# Patient Record
Sex: FEMALE | Race: BLACK OR AFRICAN AMERICAN | NOT HISPANIC OR LATINO
[De-identification: names, ages, dates, MRNs, and addresses within clinical notes are randomized per-mention and may not be internally consistent; named-entity substitution may affect disease eponyms.]

---

## 2019-09-19 ENCOUNTER — APPOINTMENT (OUTPATIENT)
Dept: PULMONOLOGY | Facility: CLINIC | Age: 54
End: 2019-09-19
Payer: COMMERCIAL

## 2019-09-19 VITALS
RESPIRATION RATE: 16 BRPM | SYSTOLIC BLOOD PRESSURE: 130 MMHG | OXYGEN SATURATION: 98 % | HEIGHT: 63 IN | BODY MASS INDEX: 36.14 KG/M2 | DIASTOLIC BLOOD PRESSURE: 80 MMHG | WEIGHT: 204 LBS | HEART RATE: 82 BPM

## 2019-09-19 DIAGNOSIS — Z82.49 FAMILY HISTORY OF ISCHEMIC HEART DISEASE AND OTHER DISEASES OF THE CIRCULATORY SYSTEM: ICD-10-CM

## 2019-09-19 DIAGNOSIS — Z82.5 FAMILY HISTORY OF ASTHMA AND OTHER CHRONIC LOWER RESPIRATORY DISEASES: ICD-10-CM

## 2019-09-19 DIAGNOSIS — Z87.898 PERSONAL HISTORY OF OTHER SPECIFIED CONDITIONS: ICD-10-CM

## 2019-09-19 DIAGNOSIS — Z83.3 FAMILY HISTORY OF DIABETES MELLITUS: ICD-10-CM

## 2019-09-19 DIAGNOSIS — Z87.2 PERSONAL HISTORY OF DISEASES OF THE SKIN AND SUBCUTANEOUS TISSUE: ICD-10-CM

## 2019-09-19 DIAGNOSIS — Z86.2 PERSONAL HISTORY OF DISEASES OF THE BLOOD AND BLOOD-FORMING ORGANS AND CERTAIN DISORDERS INVOLVING THE IMMUNE MECHANISM: ICD-10-CM

## 2019-09-19 DIAGNOSIS — Z81.1 FAMILY HISTORY OF ALCOHOL ABUSE AND DEPENDENCE: ICD-10-CM

## 2019-09-19 DIAGNOSIS — Z81.8 FAMILY HISTORY OF OTHER MENTAL AND BEHAVIORAL DISORDERS: ICD-10-CM

## 2019-09-19 DIAGNOSIS — Z86.39 PERSONAL HISTORY OF OTHER ENDOCRINE, NUTRITIONAL AND METABOLIC DISEASE: ICD-10-CM

## 2019-09-19 DIAGNOSIS — Z86.79 PERSONAL HISTORY OF OTHER DISEASES OF THE CIRCULATORY SYSTEM: ICD-10-CM

## 2019-09-19 PROCEDURE — 99204 OFFICE O/P NEW MOD 45 MIN: CPT | Mod: 25

## 2019-09-19 PROCEDURE — 71046 X-RAY EXAM CHEST 2 VIEWS: CPT

## 2019-09-19 PROCEDURE — 94729 DIFFUSING CAPACITY: CPT

## 2019-09-19 PROCEDURE — 94727 GAS DIL/WSHOT DETER LNG VOL: CPT

## 2019-09-19 PROCEDURE — 94060 EVALUATION OF WHEEZING: CPT

## 2019-09-19 PROCEDURE — ZZZZZ: CPT

## 2019-09-19 NOTE — ADDENDUM
[FreeTextEntry1] : Documented by Efrain Cabezas acting as a scribe for Dr. Rodolfo Esposito on 09/19/2019.\par \par All medical record entries made by the Scribe were at my, Dr. Rodolfo Esposito's, direction and personally dictated by me on 09/19/2019. I have reviewed the chart and agree that the record accurately reflects my personal performance of the history, physical exam, assessment and plan. I have also personally directed, reviewed, and agree with the discharge instructions.

## 2019-09-19 NOTE — ASSESSMENT
[FreeTextEntry1] : Ms. ROBERTSON is a 54 year old female with a history of sarcoidosis, "obesity" in the past s/p gastric sleeve, HTN, elevated cholesterol, anemia, eczema, allergies who comes into the office today for pulmonary evaluation\par \par The patient's shortness of breath is multifactorial due to:\par -pulmonary disease \par      -Sarcoidosis (inactive)\par      -?mild asthma\par -poor breathing mechanics \par -overweight/out of shape\par -CAD (unlikely)\par \par Problem 1: Sarcoidosis (inactive)\par -Complete yearly eye exams yearly\par -complete PFTS once or twice per year\par -ACE level is pending\par \par -Sarcoidosis is a medical mystery and can present with very serious illness or little consequence. The disease can affect any organ including skin, liver, lymph glands, spleen, eyes, nervous system, musculoskeletal system, heart, brain, kidneys, and including the lungs. Pulmonary sarcoidosis can cause loss of lung volume and abnormal lung stiffness. This disease is characterized by presence of granulomas, small areas of inflamed cells. Sarcoidosis patients should have regular cardiac and eye examinations as well as regular PFTs. \par \par Problem 2: ?Mild Asthma\par -Add Breo Ellipta 200 at 1 inhalation daily \par -Add Ventolin rescue inhaler 2 inhalations before exercise, Q6H \par \par -Inhaler technique reviewed as well as oral hygiene techniques reviewed with patient. Avoidance of cold air, extremes of temperature, rescue inhaler should be used before exercise. Order of medication reviewed with patient. Recommended use of a cool mist humidifier in the bedroom.\par -Asthma is believed to be caused by inherited (genetic) and environmental factor, but its exact cause is unknown. Asthma may be triggered by allergens, lung infections, or irritants in the air. Asthma triggers are different for each person \par \par Problem 3: GERD\par -Add Pepcid 40 mg QHS, if needed\par -Rule of 2s: avoid eating too much, eating too fast, eating too late, eating too spicy, eating too lousy, eating two hours before bed.\par -Things to avoid including overeating, spicy foods, tight clothing, eating within three hours of bed, this list is not all inclusive. \par -For treatment of reflux, possible options discussed including diet control, H2 blockers, PPIs, as well as coating motility agents discussed as treatment options. Timing of meals and proximity of last meal to sleep were discussed. If symptoms persist, a formal gastrointestinal evaluation is needed.\par \par Problem 4: Allergy / sinus\par -Complete blood work to include: ACE level, IgE level, eosinophil level, vitamin D level, food IgE level, and asthma profile \par \par Environmental measures for allergies were encouraged including mattress and pillow cover, air purifier, and environmental controls. \par \par Problem 5: r/o Gustatory Rhinitis\par -Add Atrovent Nasal\par -Gustatory rhinitis is an overreaction of the vagal nerve in the nervous system upon ingesting certain foods including, but not limited to, hot food, spicy food, and alcohol intake. Gustatory rhinitis symptoms include runny, usually clear, watery discharge, which is a result dilation of blood vessels. Other symptoms can include nasal congestion and sneezing. Treatment options include corticosteroid nasal sprays, mucolytic drugs, decongestant nasal sprays, and anticholinergic agents as well as avoidance of triggers.\par \par Problem 6: r/o FLOYD\par -Recommended to complete a home sleep study due to her snoring, EDS, and nocturia\par -Recommended not to drink 3 hours before bed\par \par Sleep apnea is associated with adverse clinical consequences which an affect most organ systems. Cardiovascular disease risk includes arrhythmias, atrial fibrillation, hypertension, coronary artery disease, and stroke. Metabolic disorders include diabetes type 2, non-alcoholic fatty liver disease. Mood disorder especially depression; and cognitive decline especially in the elderly. Associations with chronic reflux/Vallejo’s esophagus some but not all inclusive. \par -Reasons include arousal consistent with hypopnea; respiratory events most prominent in REM sleep or supine position; therefore sleep staging and body position are important for accurate diagnosis and estimation of AHI. \par \par Problem 7: Poor Mechanics of Breathing\par - Proper breathing techniques were reviewed with an emphasis of exhalation. Patient instructed to breath in for 1 second and out for four seconds. Patient was encouraged to not talk while walking. \par \par Problem 8: Overweight\par -Weight loss, exercise, and diet control were discussed and are highly encouraged. Treatment options were given such as, aqua therapy, and contacting a nutritionist. Recommended to use the elliptical, stationary bike, less use of treadmill. Mindful eating was explained to the patient Obesity is associated with worsening asthma, shortness of breath, and potential for cardiac disease, diabetes, and other underlying medical conditions. \par \par Problem 9: Cardiac\par -recommended to follow up with a cardiologist\par \par Problem 10: health maintenance\par -Recommended to use Livaguard for elevated cholesterol\par -Recommended to take CoQ10 and Vitamin D\par -s/p influenza vaccine\par -recommended strep pneumonia vaccines: Prevnar-13 vaccine, followed by Pneumo vaccine 23 one year following\par -recommended early intervention for URIs\par -recommended regular osteoporosis evaluations\par -recommended early dermatological evaluations\par -recommended after the age of 50 to the age of 70, colonoscopy every 5 years \par \par  Follow up in 6-8 weeks\par -he  is recommended to call with any changes, questions, or concerns.

## 2019-09-19 NOTE — REVIEW OF SYSTEMS
[Nasal Congestion] : nasal congestion [Postnasal Drip] : postnasal drip [Dyspnea] : dyspnea [Heartburn] : heartburn [Reflux] : reflux [Nocturia] : nocturia [Rash] : [unfilled] rash [Itch] : itching [As Noted in HPI] : as noted in HPI [Snoring] : snoring [Nonrestorative Sleep] : nonrestorative sleep [Negative] : Pulmonary Hypertension [Chest Discomfort] : no chest discomfort [Dysphagia] : no dysphagia [Constipation] : no constipation [Diarrhea] : no diarrhea

## 2019-09-19 NOTE — PHYSICAL EXAM
[General Appearance - Well Developed] : well developed [Normal Appearance] : normal appearance [Well Groomed] : well groomed [General Appearance - Well Nourished] : well nourished [General Appearance - In No Acute Distress] : no acute distress [No Deformities] : no deformities [Normal Conjunctiva] : the conjunctiva exhibited no abnormalities [Eyelids - No Xanthelasma] : the eyelids demonstrated no xanthelasmas [Normal Oropharynx] : normal oropharynx [III] : III [Neck Appearance] : the appearance of the neck was normal [Neck Cervical Mass (___cm)] : no neck mass was observed [Jugular Venous Distention Increased] : there was no jugular-venous distention [Thyroid Nodule] : there were no palpable thyroid nodules [Thyroid Diffuse Enlargement] : the thyroid was not enlarged [Heart Rate And Rhythm] : heart rate and rhythm were normal [Heart Sounds] : normal S1 and S2 [Murmurs] : no murmurs present [Respiration, Rhythm And Depth] : normal respiratory rhythm and effort [Exaggerated Use Of Accessory Muscles For Inspiration] : no accessory muscle use [Auscultation Breath Sounds / Voice Sounds] : lungs were clear to auscultation bilaterally [Abdomen Tenderness] : non-tender [Abdomen Soft] : soft [Abdomen Mass (___ Cm)] : no abdominal mass palpated [Abnormal Walk] : normal gait [Gait - Sufficient For Exercise Testing] : the gait was sufficient for exercise testing [Nail Clubbing] : no clubbing of the fingernails [Cyanosis, Localized] : no localized cyanosis [Petechial Hemorrhages (___cm)] : no petechial hemorrhages [Skin Color & Pigmentation] : normal skin color and pigmentation [Skin Turgor] : normal skin turgor [] : no rash [Deep Tendon Reflexes (DTR)] : deep tendon reflexes were 2+ and symmetric [Sensation] : the sensory exam was normal to light touch and pinprick [No Focal Deficits] : no focal deficits [Impaired Insight] : insight and judgment were intact [Oriented To Time, Place, And Person] : oriented to person, place, and time [Affect] : the affect was normal [FreeTextEntry1] : I:E ratio 1:3; clear

## 2019-09-19 NOTE — HISTORY OF PRESENT ILLNESS
[FreeTextEntry1] : Ms. ROBERTSON is a 54 year old female presenting to the office today for initial pulmonary evaluation. Her chief complaint is SOB.\par -she reports she was referred by [facility]\par -she notes she sneezes and has rhinorrhea after every meal for her whole life\par -she notes having sarcoidosis diagnosed in 1997, and was placed on 60 mg of Prednisone initially and gained a lot of weight and couldn’t get rid of it\par -she notes she becomes SOB when walking up stairs that has gotten worse recently (the past 6 months)\par -she notes she just had her kitchen renovated in the past 6 months\par -she notes she has heartburn in the evening occasionally\par -she notes she has eczema, and recently got a new medication for it, with rashes on her hands and her back\par -she notes she is sleeping well, using Tylenol PM\par -she is unsure if she currently snores, and reports having had a sleep study done before her surgery in 2013\par -she reports she occasionally wakes up tired\par -she is unsure if she could fall asleep in a car \par -she could fall asleep while watching a boring TV show \par -she notes she has a lump in her throat she has to clear occasionally\par -she notes she exercises by walking\par -she reports waking up with nocturia twice per night, and drinks a lot of water throughout the day\par -she doesn’t believe her Ventolin is helping her\par -she denies any lymphadenopathy, chest pain, chest pressure, diarrhea, constipation, dysphagia, dizziness, leg swelling, leg pain, itchy eyes, itchy ears, reflux, sour taste in the mouth, myalgias or arthralgias.

## 2019-11-20 ENCOUNTER — APPOINTMENT (OUTPATIENT)
Dept: OTOLARYNGOLOGY | Facility: CLINIC | Age: 54
End: 2019-11-20

## 2019-11-21 ENCOUNTER — APPOINTMENT (OUTPATIENT)
Dept: PULMONOLOGY | Facility: CLINIC | Age: 54
End: 2019-11-21

## 2020-01-05 ENCOUNTER — RX RENEWAL (OUTPATIENT)
Age: 55
End: 2020-01-05

## 2020-01-06 ENCOUNTER — RX RENEWAL (OUTPATIENT)
Age: 55
End: 2020-01-06

## 2020-03-25 ENCOUNTER — APPOINTMENT (OUTPATIENT)
Dept: PULMONOLOGY | Facility: CLINIC | Age: 55
End: 2020-03-25

## 2020-04-04 ENCOUNTER — RX RENEWAL (OUTPATIENT)
Age: 55
End: 2020-04-04

## 2020-04-15 ENCOUNTER — APPOINTMENT (OUTPATIENT)
Dept: PULMONOLOGY | Facility: CLINIC | Age: 55
End: 2020-04-15
Payer: COMMERCIAL

## 2020-04-15 PROCEDURE — 99214 OFFICE O/P EST MOD 30 MIN: CPT | Mod: 95

## 2020-04-15 NOTE — HISTORY OF PRESENT ILLNESS
[Home] : at home, [unfilled] , at the time of the visit. [Medical Office: ([facility])___] : at the medical office located in  [Patient] : the patient [Self] : self [FreeTextEntry2] : ANAYELI ROBERTSON  [FreeTextEntry1] : Ms. ROBERTSON is a 54 year old female video calls to the office today for f/u pulmonary evaluation. Her chief complaint is health maintenance. \par -she notes that Her bowels are regular. \par -Sleep is good. \par -has been trying walk. \par -has heartburn/ reflux. \par -no coughing or wheezing. \par -still has Gustatory rhinitis. \par -she states that she has loss some weight recently. \par -no arthritis. \par -has some back pain. \par \par -She denies any chest pain, chest pressure, diarrhea, constipation, dysphagia, dizziness, sour taste in the mouth, leg swelling, leg pain, itchy eyes, itchy ears, myalgias or arthralgias.

## 2020-04-15 NOTE — ASSESSMENT
[FreeTextEntry1] : Ms. ROBERTSON is a 54 year old female with a history of sarcoidosis, "obesity" in the past s/p gastric sleeve, HTN, elevated cholesterol, anemia, eczema, allergies who video calls into the office today for f/u visit for Asthma, Allergies, Gustatory Rhinitis. \par \par The patient's shortness of breath is multifactorial due to:\par -pulmonary disease \par      -Sarcoidosis (inactive)\par      -?mild asthma\par -poor breathing mechanics \par -overweight/out of shape\par -CAD (unlikely)\par \par Problem 1: Sarcoidosis (inactive)\par -Complete yearly eye exams yearly\par -complete PFTS once or twice per year\par -ACE level is pending\par \par -Sarcoidosis is a medical mystery and can present with very serious illness or little consequence. The disease can affect any organ including skin, liver, lymph glands, spleen, eyes, nervous system, musculoskeletal system, heart, brain, kidneys, and including the lungs. Pulmonary sarcoidosis can cause loss of lung volume and abnormal lung stiffness. This disease is characterized by presence of granulomas, small areas of inflamed cells. Sarcoidosis patients should have regular cardiac and eye examinations as well as regular PFTs. \par \par Problem 2: ?Mild Asthma\par -continue Breo Ellipta 200 at 1 inhalation daily \par -continue Ventolin rescue inhaler 2 inhalations before exercise, Q6H \par \par -Inhaler technique reviewed as well as oral hygiene techniques reviewed with patient. Avoidance of cold air, extremes of temperature, rescue inhaler should be used before exercise. Order of medication reviewed with patient. Recommended use of a cool mist humidifier in the bedroom.\par -Asthma is believed to be caused by inherited (genetic) and environmental factor, but its exact cause is unknown. Asthma may be triggered by allergens, lung infections, or irritants in the air. Asthma triggers are different for each person \par \par Problem 3: GERD\par -continue Pepcid 40 mg QHS, if needed\par -Rule of 2s: avoid eating too much, eating too fast, eating too late, eating too spicy, eating too lousy, eating two hours before bed.\par -Things to avoid including overeating, spicy foods, tight clothing, eating within three hours of bed, this list is not all inclusive. \par -For treatment of reflux, possible options discussed including diet control, H2 blockers, PPIs, as well as coating motility agents discussed as treatment options. Timing of meals and proximity of last meal to sleep were discussed. If symptoms persist, a formal gastrointestinal evaluation is needed.\par \par Problem 4: Allergy / sinus\par -Complete blood work to include: ACE level, IgE level, eosinophil level, vitamin D level, food IgE level, and asthma profile \par \par Environmental measures for allergies were encouraged including mattress and pillow cover, air purifier, and environmental controls. \par \par Problem 5: r/o Gustatory Rhinitis\par -Add Atrovent Nasal .67 2 sniff pre-meal.\par -Gustatory rhinitis is an overreaction of the vagal nerve in the nervous system upon ingesting certain foods including, but not limited to, hot food, spicy food, and alcohol intake. Gustatory rhinitis symptoms include runny, usually clear, watery discharge, which is a result dilation of blood vessels. Other symptoms can include nasal congestion and sneezing. Treatment options include corticosteroid nasal sprays, mucolytic drugs, decongestant nasal sprays, and anticholinergic agents as well as avoidance of triggers.\par \par Problem 6: r/o FLOYD\par -Recommended to complete a home sleep study due to her snoring, EDS, and nocturia\par -Recommended not to drink 3 hours before bed\par \par Sleep apnea is associated with adverse clinical consequences which an affect most organ systems. Cardiovascular disease risk includes arrhythmias, atrial fibrillation, hypertension, coronary artery disease, and stroke. Metabolic disorders include diabetes type 2, non-alcoholic fatty liver disease. Mood disorder especially depression; and cognitive decline especially in the elderly. Associations with chronic reflux/Vallejo’s esophagus some but not all inclusive. \par -Reasons include arousal consistent with hypopnea; respiratory events most prominent in REM sleep or supine position; therefore sleep staging and body position are important for accurate diagnosis and estimation of AHI. \par \par Problem 7: Poor Mechanics of Breathing\par - Proper breathing techniques were reviewed with an emphasis of exhalation. Patient instructed to breath in for 1 second and out for four seconds. Patient was encouraged to not talk while walking. \par \par Problem 8: Overweight\par -Weight loss, exercise, and diet control were discussed and are highly encouraged. Treatment options were given such as, aqua therapy, and contacting a nutritionist. Recommended to use the elliptical, stationary bike, less use of treadmill. Mindful eating was explained to the patient Obesity is associated with worsening asthma, shortness of breath, and potential for cardiac disease, diabetes, and other underlying medical conditions. \par \par Problem 9: Cardiac\par -recommended to follow up with a cardiologist\par \par Problem 10: Health Maintenance/COVID19 Precautions:\par - Clean your hands often. Wash your hands often with soap and water for at least 20 seconds, especially after blowing your nose, coughing, or sneezing, or having been in a public place.\par - If soap and water are not available, use a hand  that contains at least 60% alcohol.\par - To the extent possible, avoid touching high-touch surfaces in public places - elevator buttons, door handles, handrails, handshaking with people, etc. Use a tissue or your sleeve to cover your hand or finger if you must touch something.\par - Wash your hands after touching surfaces in public places.\par - Avoid touching your face, nose, eyes, etc.\par - Clean and disinfect your home to remove germs: practice routine cleaning of frequently touched surfaces (for example: tables, doorknobs, light switches, handles, desks, toilets, faucets, sinks & cell phones)\par - Avoid crowds, especially in poorly ventilated spaces. Your risk of exposure to respiratory viruses like COVID-19 may increase in crowded, closed-in settings with little air circulation if\par there are people in the crowd who are sick. All patients are recommended to practice social distancing and stay at least 6 feet away from others.\par - Avoid all non-essential travel including plane trips, and especially avoid embarking on cruise ships.\par -If COVID-19 is spreading in your community, take extra measures to put distance between yourself and other people to further reduce your risk of being exposed to this new virus.\par -Stay home as much as possible.\par - Consider ways of getting food brought to your house through family, social, or commercial networks\par -Be aware that the virus has been known to live in the air up to 3 hours post exposure, cardboard up to 24 hours post exposure, copper up to 4 hours post exposure, steel and plastic up to 2-3 days post exposure. Risk of transmission from these surfaces are affected by many variables.\par \par Immune Support Recommendations:\par -OTC Vitamin C 500mg BID \par -OTC Quercetin 250-500mg BID \par -OTC Zinc 75-100mg per day \par -OTC Melatonin 1or 2mg a night \par -OTC Vitamin D 1-4000mg per day\par -Tonic Water 8oz\par -Recommended to Stay Hydrated (At least a gallon/day)\par \par Asthma and COVID19:\par You need to make sure your asthma is under control. This often requires the use of inhaled corticosteroids (and sometimes oral corticosteroids). Inhaled corticosteroids do not likely reduce your immune system’s ability to fight infections, but oral corticosteroids may. It is important to use the steps above to protect yourself to limit your exposure to any respiratory virus. \par \par Problem 11: health maintenance\par -Recommended to use Livaguard for elevated cholesterol\par -Recommended to take CoQ10 and Vitamin D\par -s/p influenza vaccine\par -recommended strep pneumonia vaccines: Prevnar-13 vaccine, followed by Pneumo vaccine 23 one year following\par -recommended early intervention for URIs\par -recommended regular osteoporosis evaluations\par -recommended early dermatological evaluations\par -recommended after the age of 50 to the age of 70, colonoscopy every 5 years \par \par  Follow up in 3months SPI \par -he  is recommended to call with any changes, questions, or concerns.

## 2020-04-15 NOTE — REASON FOR VISIT
[Follow-Up] : a follow-up visit [FreeTextEntry1] : Video Telehealth - Sarcoidosis, SOB, OSAS, Asthma

## 2020-04-15 NOTE — ADDENDUM
[FreeTextEntry1] : Documented by Tashi Osborne acting as a scribe for Dr. Rodolfo Esposito on 04/15/2020 \par \par All medical record entries made by the Scribe were at my, Dr. Rodolfo Esposito's, direction and personally dictated by me on 04/15/2020 . I have reviewed the chart and agree that the record accurately reflects my personal performance of the history, physical exam, assessment and plan. I have also personally directed, reviewed, and agree with the discharge instructions.

## 2021-03-24 ENCOUNTER — RX RENEWAL (OUTPATIENT)
Age: 56
End: 2021-03-24

## 2021-03-26 ENCOUNTER — RX RENEWAL (OUTPATIENT)
Age: 56
End: 2021-03-26

## 2021-03-29 ENCOUNTER — APPOINTMENT (OUTPATIENT)
Dept: PULMONOLOGY | Facility: CLINIC | Age: 56
End: 2021-03-29
Payer: COMMERCIAL

## 2021-03-29 VITALS
TEMPERATURE: 97.3 F | SYSTOLIC BLOOD PRESSURE: 121 MMHG | BODY MASS INDEX: 36.14 KG/M2 | HEIGHT: 63 IN | HEART RATE: 94 BPM | OXYGEN SATURATION: 96 % | WEIGHT: 204 LBS | RESPIRATION RATE: 16 BRPM | DIASTOLIC BLOOD PRESSURE: 80 MMHG

## 2021-03-29 PROCEDURE — 99072 ADDL SUPL MATRL&STAF TM PHE: CPT

## 2021-03-29 PROCEDURE — 99214 OFFICE O/P EST MOD 30 MIN: CPT

## 2021-03-29 NOTE — PHYSICAL EXAM
[No Acute Distress] : no acute distress [Normal Oropharynx] : normal oropharynx [III] : Mallampati Class: III [Normal Appearance] : normal appearance [No Neck Mass] : no neck mass [Normal Rate/Rhythm] : normal rate/rhythm [Normal S1, S2] : normal s1, s2 [No Murmurs] : no murmurs [No Resp Distress] : no resp distress [Clear to Auscultation Bilaterally] : clear to auscultation bilaterally [No Abnormalities] : no abnormalities [Benign] : benign [Normal Gait] : normal gait [No Clubbing] : no clubbing [No Cyanosis] : no cyanosis [No Edema] : no edema [FROM] : FROM [Normal Color/ Pigmentation] : normal color/ pigmentation [No Focal Deficits] : no focal deficits [Oriented x3] : oriented x3 [Normal Affect] : normal affect [TextBox_2] : OW [TextBox_68] : I:E ratio 1:3; clear

## 2021-03-29 NOTE — HISTORY OF PRESENT ILLNESS
[FreeTextEntry1] : Ms. ROBERTSON is a 54 year old female presents to the office today for f/u pulmonary evaluation. Her chief complaint is\par \par -she notes generally feeling well\par -she notes improved following past sarcoidosis flair\par -she notes gustatory rhinitis quiet with improved eating mechanisms\par -she notes intermittent diarrhea\par -she notes weight loss with new diet and aiming to lose weight\par -she notes s/p COVID vaccine x 2\par -she notes her memory and concentration are good \par -she denies palpitations\par -she notes intermittent SOB exacerbated by walking and talking at the same time\par -she notes wheeze quiet with mask use during cold weather\par -she notes sleep improved on Lunesta \par -she notes unsure if snores\par \par -denies any chest pain, chest pressure, diarrhea, constipation, dysphagia, sour taste in the mouth, dizziness, leg swelling, leg pain, myalgias, arthralgias, itchy eyes, itchy ears, heartburn, or reflux.\par \par

## 2021-03-29 NOTE — ADDENDUM
[FreeTextEntry1] : Documented by Eliot Small acting as a scribe for Dr. Rodolfo Esposito on 03/29/2021.\par \par All medical record entries made by the Scribe were at my, Dr. Rodolfo Esposito's, direction and personally dictated by me on 03/29/2021 . I have reviewed the chart and agree that the record accurately reflects my personal performance of the history, physical exam, assessment and plan. I have also personally directed, reviewed, and agree with the discharge instructions. \par

## 2021-07-26 DIAGNOSIS — Z01.812 ENCOUNTER FOR PREPROCEDURAL LABORATORY EXAMINATION: ICD-10-CM

## 2021-08-05 ENCOUNTER — LABORATORY RESULT (OUTPATIENT)
Age: 56
End: 2021-08-05

## 2021-08-06 ENCOUNTER — APPOINTMENT (OUTPATIENT)
Dept: DISASTER EMERGENCY | Facility: CLINIC | Age: 56
End: 2021-08-06

## 2021-08-06 ENCOUNTER — NON-APPOINTMENT (OUTPATIENT)
Age: 56
End: 2021-08-06

## 2021-08-06 ENCOUNTER — LABORATORY RESULT (OUTPATIENT)
Age: 56
End: 2021-08-06

## 2021-08-06 LAB
24R-OH-CALCIDIOL SERPL-MCNC: 64.4 PG/ML
25(OH)D3 SERPL-MCNC: 10.3 NG/ML
BASOPHILS # BLD AUTO: 0.06 K/UL
BASOPHILS NFR BLD AUTO: 1 %
EOSINOPHIL # BLD AUTO: 0.08 K/UL
EOSINOPHIL NFR BLD AUTO: 1.4 %
GLUCOSE 1H P LAC PO SERPL-MCNC: 118 MG/DL
GLUCOSE 30M P LAC PO SERPL-MCNC: 119 MG/DL
HCT VFR BLD CALC: 33.3 %
HGB BLD-MCNC: 10.7 G/DL
IMM GRANULOCYTES NFR BLD AUTO: 0.3 %
LACTOSE 1.5H P LAC PO SERPL-SCNC: 119 MG/DL
LACTOSE 2H P 50 G LAC PO SERPL-MCNC: 109 MG/DL
LACTOSE 3H P LAC PO SERPL-MCNC: 108 MG/DL
LACTOSE PRE FAST SERPL-MCNC: 105 MG/DL
LYMPHOCYTES # BLD AUTO: 2.44 K/UL
LYMPHOCYTES NFR BLD AUTO: 41.3 %
MAN DIFF?: NORMAL
MCHC RBC-ENTMCNC: 27.3 PG
MCHC RBC-ENTMCNC: 32.1 GM/DL
MCV RBC AUTO: 84.9 FL
MONOCYTES # BLD AUTO: 0.33 K/UL
MONOCYTES NFR BLD AUTO: 5.6 %
NEUTROPHILS # BLD AUTO: 2.98 K/UL
NEUTROPHILS NFR BLD AUTO: 50.4 %
PLATELET # BLD AUTO: 240 K/UL
RBC # BLD: 3.92 M/UL
RBC # FLD: 14.7 %
WBC # FLD AUTO: 5.91 K/UL

## 2021-08-09 ENCOUNTER — APPOINTMENT (OUTPATIENT)
Dept: PULMONOLOGY | Facility: CLINIC | Age: 56
End: 2021-08-09
Payer: COMMERCIAL

## 2021-08-09 ENCOUNTER — NON-APPOINTMENT (OUTPATIENT)
Age: 56
End: 2021-08-09

## 2021-08-09 VITALS
HEART RATE: 84 BPM | TEMPERATURE: 97.5 F | OXYGEN SATURATION: 98 % | WEIGHT: 207 LBS | DIASTOLIC BLOOD PRESSURE: 70 MMHG | SYSTOLIC BLOOD PRESSURE: 120 MMHG | RESPIRATION RATE: 16 BRPM | HEIGHT: 65 IN | BODY MASS INDEX: 34.49 KG/M2

## 2021-08-09 DIAGNOSIS — K21.9 GASTRO-ESOPHAGEAL REFLUX DISEASE W/OUT ESOPHAGITIS: ICD-10-CM

## 2021-08-09 DIAGNOSIS — J30.9 ALLERGIC RHINITIS, UNSPECIFIED: ICD-10-CM

## 2021-08-09 DIAGNOSIS — J31.0 CHRONIC RHINITIS: ICD-10-CM

## 2021-08-09 DIAGNOSIS — J45.909 UNSPECIFIED ASTHMA, UNCOMPLICATED: ICD-10-CM

## 2021-08-09 DIAGNOSIS — R06.02 SHORTNESS OF BREATH: ICD-10-CM

## 2021-08-09 DIAGNOSIS — D86.9 SARCOIDOSIS, UNSPECIFIED: ICD-10-CM

## 2021-08-09 PROCEDURE — ZZZZZ: CPT

## 2021-08-09 PROCEDURE — 94010 BREATHING CAPACITY TEST: CPT

## 2021-08-09 PROCEDURE — 99214 OFFICE O/P EST MOD 30 MIN: CPT | Mod: 25

## 2021-08-09 NOTE — ASSESSMENT
[FreeTextEntry1] : Ms. ROBERTSON is a 56 year old female with a history of sarcoidosis, "obesity" in the past s/p gastric sleeve, HTN, elevated cholesterol, anemia, eczema, allergies who presents  into the office today for f/u visit for Asthma, Allergies, Gustatory Rhinitis. #1 issue is GI sx, diarrhea \par \par The patient's shortness of breath is multifactorial due to:\par -pulmonary disease \par      -Sarcoidosis (inactive)\par      -?mild asthma\par -poor breathing mechanics \par -overweight/out of shape\par -CAD (unlikely)\par \par Problem 1: Sarcoidosis (inactive)\par -Complete yearly eye exams yearly\par -complete PFTS once or twice per year\par -ACE level is pending\par \par -Sarcoidosis is a medical mystery and can present with very serious illness or little consequence. The disease can affect any organ including skin, liver, lymph glands, spleen, eyes, nervous system, musculoskeletal system, heart, brain, kidneys, and including the lungs. Pulmonary sarcoidosis can cause loss of lung volume and abnormal lung stiffness. This disease is characterized by presence of granulomas, small areas of inflamed cells. Sarcoidosis patients should have regular cardiac and eye examinations as well as regular PFTs. \par \par Problem 2: ?Mild Asthma\par -continue Breo Ellipta 200 at 1 inhalation daily \par -continue Ventolin rescue inhaler 2 inhalations before exercise, Q6H \par \par -Inhaler technique reviewed as well as oral hygiene techniques reviewed with patient. Avoidance of cold air, extremes of temperature, rescue inhaler should be used before exercise. Order of medication reviewed with patient. Recommended use of a cool mist humidifier in the bedroom.\par -Asthma is believed to be caused by inherited (genetic) and environmental factor, but its exact cause is unknown. Asthma may be triggered by allergens, lung infections, or irritants in the air. Asthma triggers are different for each person \par \par Problem 3: GERD\par -continue Pepcid 40 mg QHS, if needed\par -Rule of 2s: avoid eating too much, eating too fast, eating too late, eating too spicy, eating too lousy, eating two hours before bed.\par -Things to avoid including overeating, spicy foods, tight clothing, eating within three hours of bed, this list is not all inclusive. \par -For treatment of reflux, possible options discussed including diet control, H2 blockers, PPIs, as well as coating motility agents discussed as treatment options. Timing of meals and proximity of last meal to sleep were discussed. If symptoms persist, a formal gastrointestinal evaluation is needed.\par \par Problem 4: Allergy / sinus\par -Complete blood work to include: ACE level, IgE level, eosinophil level, vitamin D level, food IgE level, and asthma profile , comprehensive celiac cascade, lactose intolerance screening\par Environmental measures for allergies were encouraged including mattress and pillow cover, air purifier, and environmental controls. \par \par Problem 5: r/o Gustatory Rhinitis\par -continue Atrovent Nasal .67 2 sniff pre-meal.\par -Gustatory rhinitis is an overreaction of the vagal nerve in the nervous system upon ingesting certain foods including, but not limited to, hot food, spicy food, and alcohol intake. Gustatory rhinitis symptoms include runny, usually clear, watery discharge, which is a result dilation of blood vessels. Other symptoms can include nasal congestion and sneezing. Treatment options include corticosteroid nasal sprays, mucolytic drugs, decongestant nasal sprays, and anticholinergic agents as well as avoidance of triggers.\par \par Problem 6: r/o FLOYD\par -Recommended to complete a home sleep study due to her snoring, EDS, and nocturia\par -Recommended not to drink 3 hours before bed\par \par Sleep apnea is associated with adverse clinical consequences which an affect most organ systems. Cardiovascular disease risk includes arrhythmias, atrial fibrillation, hypertension, coronary artery disease, and stroke. Metabolic disorders include diabetes type 2, non-alcoholic fatty liver disease. Mood disorder especially depression; and cognitive decline especially in the elderly. Associations with chronic reflux/Vallejo’s esophagus some but not all inclusive. \par -Reasons include arousal consistent with hypopnea; respiratory events most prominent in REM sleep or supine position; therefore sleep staging and body position are important for accurate diagnosis and estimation of AHI. \par \par Problem 7: Poor Mechanics of Breathing\par - Proper breathing techniques were reviewed with an emphasis of exhalation. Patient instructed to breath in for 1 second and out for four seconds. Patient was encouraged to not talk while walking. \par \par Problem 8: Overweight\par -Weight loss, exercise, and diet control were discussed and are highly encouraged. Treatment options were given such as, aqua therapy, and contacting a nutritionist. Recommended to use the elliptical, stationary bike, less use of treadmill. Mindful eating was explained to the patient Obesity is associated with worsening asthma, shortness of breath, and potential for cardiac disease, diabetes, and other underlying medical conditions. \par \par Problem 9: Cardiac\par -recommended to follow up with a cardiologist (if needed) \par \par Problem 10: Health Maintenance/COVID19 Precautions:\par -s/p COVID 19 vaccine x 2 Moderna\par - Clean your hands often. Wash your hands often with soap and water for at least 20 seconds, especially after blowing your nose, coughing, or sneezing, or having been in a public place.\par - If soap and water are not available, use a hand  that contains at least 60% alcohol.\par - To the extent possible, avoid touching high-touch surfaces in public places - elevator buttons, door handles, handrails, handshaking with people, etc. Use a tissue or your sleeve to cover your hand or finger if you must touch something.\par - Wash your hands after touching surfaces in public places.\par - Avoid touching your face, nose, eyes, etc.\par - Clean and disinfect your home to remove germs: practice routine cleaning of frequently touched surfaces (for example: tables, doorknobs, light switches, handles, desks, toilets, faucets, sinks & cell phones)\par - Avoid crowds, especially in poorly ventilated spaces. Your risk of exposure to respiratory viruses like COVID-19 may increase in crowded, closed-in settings with little air circulation if\par there are people in the crowd who are sick. All patients are recommended to practice social distancing and stay at least 6 feet away from others.\par - Avoid all non-essential travel including plane trips, and especially avoid embarking on cruise ships.\par -If COVID-19 is spreading in your community, take extra measures to put distance between yourself and other people to further reduce your risk of being exposed to this new virus.\par -Stay home as much as possible.\par - Consider ways of getting food brought to your house through family, social, or commercial networks\par -Be aware that the virus has been known to live in the air up to 3 hours post exposure, cardboard up to 24 hours post exposure, copper up to 4 hours post exposure, steel and plastic up to 2-3 days post exposure. Risk of transmission from these surfaces are affected by many variables.\par \par Immune Support Recommendations:\par -OTC Vitamin C 500mg BID \par -OTC Quercetin 250-500mg BID \par -OTC Zinc 75-100mg per day \par -OTC Melatonin 1or 2mg a night \par -OTC Vitamin D 1-4000mg per day\par -Tonic Water 8oz\par -Recommended to Stay Hydrated (At least a gallon/day)\par \par Asthma and COVID19:\par You need to make sure your asthma is under control. This often requires the use of inhaled corticosteroids (and sometimes oral corticosteroids). Inhaled corticosteroids do not likely reduce your immune system’s ability to fight infections, but oral corticosteroids may. It is important to use the steps above to protect yourself to limit your exposure to any respiratory virus. \par \par Problem 11: health maintenance\par -Muscle cramps; recommended Myocalm PM/ Thermaworx \par - S/p Covid 19 vaccine (Moderna) x2 \par -Recommended to use Livaguard for elevated cholesterol\par -Recommended to take CoQ10 and Vitamin D\par -s/p influenza vaccine\par -recommended strep pneumonia vaccines: Prevnar-13 vaccine, followed by Pneumo vaccine 23 one year following\par -recommended early intervention for URIs\par -recommended regular osteoporosis evaluations\par -recommended early dermatological evaluations\par -recommended after the age of 50 to the age of 70, colonoscopy every 5 years \par \par  Follow up in 3months SPI \par -he  is recommended to call with any changes, questions, or concerns.

## 2021-08-09 NOTE — HISTORY OF PRESENT ILLNESS
[FreeTextEntry1] : Ms. ROBERTSON is a 56 year old female presents to the office today for f/u pulmonary evaluation. Her chief complaint is\par \par -she notes very frequent bowel movements \par -She notes itchy ears \par -she notes everything she eats flushes right out \par -She notes she started stretching with the resistant band\par -She was prescribed magnesium \par -She notes having an endo- and colonoscopy \par -she denies wheezing \par -She notes SOB when exercising \par -no new medications, vitamins, or supplements \par -She notes diarrhea for the last 3-4 months \par -She notes using the bathroom 3-4 times just in the morning after she has a bathroom \par - S/p Covid 19 vaccine (Moderna) x2 \par patient denies any headaches, nausea, vomiting, fever, chills, sweats, chest pain, chest pressure, palpitations, coughing, wheezing, fatigue, diarrhea, constipation, dysphagia, myalgias, dizziness, leg swelling, leg pain, itchy eyes, itchy ears, heartburn, reflux or sour taste in the mouth

## 2021-08-09 NOTE — PROCEDURE
[FreeTextEntry1] : blood work shows a low vitamin D-25 level of  10.3 \par \par lactose test shows normal results \par \par PFT revealed normal flows, with a FEV1 of 2.4L, which is 106% of predicted, with a normal flow volume loop\par

## 2021-08-09 NOTE — ADDENDUM
[FreeTextEntry1] : Documented by Paulette Luna acting as a scribe for Dr. Rodolfo Esposito on (08/09/2021).\par \par All medical record entries made by the Scribe were at my, Dr. Rodolfo Esposito's, direction and personally dictated by me on (08/09/2021). I have reviewed the chart and agree that the record accurately reflects my personal performance of the history, physical exam, assessment and plan. I have also personally directed, reviewed, and agree with the discharge instructions.\par

## 2021-08-10 LAB
CELIACPAN: NORMAL
CLAM IGE QN: <0.1 KUA/L
CODFISH IGE QN: <0.1 KUA/L
CORN IGE QN: <0.1 KUA/L
COW MILK IGE QN: <0.1 KUA/L
DEPRECATED CLAM IGE RAST QL: 0
DEPRECATED CODFISH IGE RAST QL: 0
DEPRECATED CORN IGE RAST QL: 0
DEPRECATED COW MILK IGE RAST QL: 0
DEPRECATED EGG WHITE IGE RAST QL: 0
DEPRECATED PEANUT IGE RAST QL: 0
DEPRECATED SCALLOP IGE RAST QL: <0.1 KUA/L
DEPRECATED SESAME SEED IGE RAST QL: 0
DEPRECATED SHRIMP IGE RAST QL: 0
DEPRECATED SOYBEAN IGE RAST QL: 0
DEPRECATED WALNUT IGE RAST QL: 0
DEPRECATED WHEAT IGE RAST QL: 0
EGG WHITE IGE QN: <0.1 KUA/L
PEANUT IGE QN: <0.1 KUA/L
SCALLOP IGE QN: 0
SCALLOP IGE QN: <0.1 KUA/L
SESAME SEED IGE QN: <0.1 KUA/L
SOYBEAN IGE QN: <0.1 KUA/L
TOTAL IGE SMQN RAST: 59 KU/L
WALNUT IGE QN: <0.1 KUA/L
WHEAT IGE QN: <0.1 KUA/L

## 2021-08-11 LAB
A ALTERNATA IGE QN: 0.25 KUA/L
A FUMIGATUS IGE QN: <0.1 KUA/L
C ALBICANS IGE QN: 0.19 KUA/L
C HERBARUM IGE QN: <0.1 KUA/L
CAT DANDER IGE QN: <0.1 KUA/L
COMMON RAGWEED IGE QN: <0.1 KUA/L
D FARINAE IGE QN: <0.1 KUA/L
D PTERONYSS IGE QN: <0.1 KUA/L
DEPRECATED A ALTERNATA IGE RAST QL: NORMAL
DEPRECATED A FUMIGATUS IGE RAST QL: 0
DEPRECATED C ALBICANS IGE RAST QL: NORMAL
DEPRECATED C HERBARUM IGE RAST QL: 0
DEPRECATED CAT DANDER IGE RAST QL: 0
DEPRECATED COMMON RAGWEED IGE RAST QL: 0
DEPRECATED D FARINAE IGE RAST QL: 0
DEPRECATED D PTERONYSS IGE RAST QL: 0
DEPRECATED DOG DANDER IGE RAST QL: 0
DEPRECATED M RACEMOSUS IGE RAST QL: 0
DEPRECATED ROACH IGE RAST QL: NORMAL
DEPRECATED TIMOTHY IGE RAST QL: 0
DEPRECATED WHITE OAK IGE RAST QL: 0
DOG DANDER IGE QN: <0.1 KUA/L
M RACEMOSUS IGE QN: <0.1 KUA/L
ROACH IGE QN: 0.15 KUA/L
TIMOTHY IGE QN: <0.1 KUA/L
WHITE OAK IGE QN: <0.1 KUA/L

## 2021-08-12 LAB
ANNOTATION COMMENT IMP: NORMAL
CELIAC DISEASE INTERPRETATION: NORMAL
CELIAC GENE PAIRS PRESENT: YES
DQ ALPHA 1: NORMAL
DQ BETA 1: NORMAL
HLA-DQ2: NEGATIVE
HLA-DQ8 QL: POSITIVE
IMMUNOGLOBULIN A (IGA): 357 MG/DL
REF LAB TEST METHOD: NORMAL

## 2021-08-19 ENCOUNTER — NON-APPOINTMENT (OUTPATIENT)
Age: 56
End: 2021-08-19

## 2021-10-07 ENCOUNTER — RX RENEWAL (OUTPATIENT)
Age: 56
End: 2021-10-07

## 2021-12-08 ENCOUNTER — APPOINTMENT (OUTPATIENT)
Dept: PULMONOLOGY | Facility: CLINIC | Age: 56
End: 2021-12-08

## 2021-12-15 ENCOUNTER — RX RENEWAL (OUTPATIENT)
Age: 56
End: 2021-12-15

## 2021-12-15 RX ORDER — ALBUTEROL SULFATE 90 UG/1
108 (90 BASE) INHALANT RESPIRATORY (INHALATION)
Qty: 1 | Refills: 2 | Status: ACTIVE | COMMUNITY
Start: 2021-10-07 | End: 1900-01-01

## 2022-03-13 ENCOUNTER — RX RENEWAL (OUTPATIENT)
Age: 57
End: 2022-03-13

## 2022-03-14 ENCOUNTER — RX RENEWAL (OUTPATIENT)
Age: 57
End: 2022-03-14

## 2023-02-21 ENCOUNTER — NON-APPOINTMENT (OUTPATIENT)
Age: 58
End: 2023-02-21

## 2023-02-21 ENCOUNTER — APPOINTMENT (OUTPATIENT)
Dept: BARIATRICS | Facility: CLINIC | Age: 58
End: 2023-02-21
Payer: COMMERCIAL

## 2023-02-21 VITALS
OXYGEN SATURATION: 99 % | HEART RATE: 78 BPM | BODY MASS INDEX: 34.91 KG/M2 | HEIGHT: 63 IN | SYSTOLIC BLOOD PRESSURE: 124 MMHG | WEIGHT: 197 LBS | DIASTOLIC BLOOD PRESSURE: 72 MMHG | TEMPERATURE: 97.4 F

## 2023-02-21 DIAGNOSIS — Z13.29 ENCOUNTER FOR SCREENING FOR OTHER SUSPECTED ENDOCRINE DISORDER: ICD-10-CM

## 2023-02-21 DIAGNOSIS — Z13.228 ENCOUNTER FOR SCREENING FOR OTHER METABOLIC DISORDERS: ICD-10-CM

## 2023-02-21 DIAGNOSIS — Z13.228 ENCOUNTER FOR SCREENING FOR OTHER SUSPECTED ENDOCRINE DISORDER: ICD-10-CM

## 2023-02-21 DIAGNOSIS — Z01.818 ENCOUNTER FOR OTHER PREPROCEDURAL EXAMINATION: ICD-10-CM

## 2023-02-21 DIAGNOSIS — Z13.0 ENCOUNTER FOR SCREENING FOR DISEASES OF THE BLOOD AND BLOOD-FORMING ORGANS AND CERTAIN DISORDERS INVOLVING THE IMMUNE MECHANISM: ICD-10-CM

## 2023-02-21 DIAGNOSIS — R63.5 ABNORMAL WEIGHT GAIN: ICD-10-CM

## 2023-02-21 DIAGNOSIS — Z13.0 ENCOUNTER FOR SCREENING FOR OTHER SUSPECTED ENDOCRINE DISORDER: ICD-10-CM

## 2023-02-21 PROCEDURE — 99205 OFFICE O/P NEW HI 60 MIN: CPT

## 2023-02-21 RX ORDER — HALOBETASOL PROPIONATE 0.5 MG/G
0.05 OINTMENT TOPICAL
Qty: 50 | Refills: 0 | Status: DISCONTINUED | COMMUNITY
Start: 2020-01-27 | End: 2023-02-21

## 2023-02-21 RX ORDER — LOSARTAN POTASSIUM AND HYDROCHLOROTHIAZIDE 12.5; 5 MG/1; MG/1
50-12.5 TABLET ORAL
Qty: 30 | Refills: 0 | Status: DISCONTINUED | COMMUNITY
Start: 2019-12-04 | End: 2023-02-21

## 2023-02-21 RX ORDER — IPRATROPIUM BROMIDE 42 UG/1
0.06 SPRAY NASAL 3 TIMES DAILY
Qty: 3 | Refills: 1 | Status: DISCONTINUED | COMMUNITY
Start: 2019-09-19 | End: 2023-02-21

## 2023-02-21 RX ORDER — ESZOPICLONE 2 MG/1
2 TABLET, FILM COATED ORAL
Qty: 30 | Refills: 0 | Status: DISCONTINUED | COMMUNITY
Start: 2019-12-04 | End: 2023-02-21

## 2023-02-21 RX ORDER — FERROUS SULFATE 325(65) MG
200 (65 FE) TABLET ORAL
Refills: 0 | Status: DISCONTINUED | COMMUNITY
End: 2023-02-21

## 2023-02-21 RX ORDER — PHENTERMINE HYDROCHLORIDE 37.5 MG/1
37.5 TABLET ORAL
Qty: 7 | Refills: 0 | Status: DISCONTINUED | COMMUNITY
Start: 2020-02-12 | End: 2023-02-21

## 2023-02-21 RX ORDER — ROSUVASTATIN CALCIUM 5 MG/1
5 TABLET, FILM COATED ORAL
Refills: 0 | Status: DISCONTINUED | COMMUNITY
End: 2023-02-21

## 2023-02-21 RX ORDER — MELOXICAM 15 MG/1
15 TABLET ORAL
Qty: 30 | Refills: 0 | Status: DISCONTINUED | COMMUNITY
Start: 2021-04-21 | End: 2023-02-21

## 2023-02-21 RX ORDER — ESZOPICLONE 3 MG/1
3 TABLET, FILM COATED ORAL
Qty: 30 | Refills: 0 | Status: DISCONTINUED | COMMUNITY
Start: 2020-03-18 | End: 2023-02-21

## 2023-02-21 RX ORDER — LOSARTAN POTASSIUM AND HYDROCHLOROTHIAZIDE 12.5; 1 MG/1; MG/1
100-12.5 TABLET ORAL
Refills: 0 | Status: DISCONTINUED | COMMUNITY
End: 2023-02-21

## 2023-02-21 RX ORDER — MAGNESIUM OXIDE 241.3 MG/1000MG
400 TABLET ORAL
Qty: 30 | Refills: 0 | Status: DISCONTINUED | COMMUNITY
Start: 2021-05-10 | End: 2023-02-21

## 2023-02-21 RX ORDER — SODIUM PICOSULFATE, MAGNESIUM OXIDE, AND ANHYDROUS CITRIC ACID 10; 3.5; 12 MG/160ML; G/160ML; G/160ML
10-3.5-12 MG-GM LIQUID ORAL
Qty: 320 | Refills: 0 | Status: DISCONTINUED | COMMUNITY
Start: 2021-05-11 | End: 2023-02-21

## 2023-02-21 RX ORDER — PANTOPRAZOLE 40 MG/1
40 TABLET, DELAYED RELEASE ORAL
Qty: 30 | Refills: 0 | Status: DISCONTINUED | COMMUNITY
Start: 2021-06-10 | End: 2023-02-21

## 2023-02-27 PROBLEM — Z13.29 SCREENING FOR OTHER AND UNSPECIFIED ENDOCRINE, NUTRITIONAL, METABOLIC AND IMMUNITY DISORDERS: Status: ACTIVE | Noted: 2023-02-21

## 2023-02-27 PROBLEM — R63.5 WEIGHT GAIN: Status: ACTIVE | Noted: 2023-02-21

## 2023-02-27 PROBLEM — Z01.818 PRE-OP TESTING: Status: ACTIVE | Noted: 2023-02-21

## 2023-02-27 PROBLEM — Z13.0 SCREENING FOR OTHER DISORDERS OF BLOOD AND BLOOD-FORMING ORGANS: Status: ACTIVE | Noted: 2023-02-21

## 2023-02-27 PROBLEM — Z13.228 ENCOUNTER FOR SCREENING FOR OTHER METABOLIC DISORDERS: Status: ACTIVE | Noted: 2023-02-21

## 2023-02-27 PROBLEM — Z13.228 ENCOUNTER FOR SCREENING FOR METABOLIC DISORDER: Status: ACTIVE | Noted: 2023-02-21

## 2023-02-27 RX ORDER — DEXTROMETHORPHAN POLISTIREX 30 MG/5 ML
SUSPENSION, EXTENDED RELEASE 12 HR ORAL
Refills: 0 | Status: ACTIVE | COMMUNITY

## 2023-02-27 RX ORDER — VALSARTAN 80 MG/1
80 TABLET, COATED ORAL DAILY
Refills: 0 | Status: ACTIVE | COMMUNITY

## 2023-02-27 RX ORDER — ESZOPICLONE 3 MG/1
3 TABLET, COATED ORAL AT BEDTIME
Refills: 0 | Status: ACTIVE | COMMUNITY

## 2023-02-27 NOTE — PHYSICAL EXAM
[Obese, well nourished, in no acute distress] : obese, well nourished, in no acute distress [Normal] : affect appropriate [de-identified] : soft, NT, ND, no evidence of hernia or diastasis, incisions well-healed, obese

## 2023-02-27 NOTE — HISTORY OF PRESENT ILLNESS
[Procedure: ___] : Procedure performed: [unfilled]  [Date of Surgery: ___] : Date of Surgery:   [unfilled] [Surgeon Name:   ___] : Surgeon Name: Dr. VILLASEÑOR [Pre-Op Weight ___] : Pre-op weight was [unfilled] lbs [de-identified] : 57 year old woman with long standing history of morbid obesity s/p Laparoscopic Sleeve Gastrectomy (2013) presents for follow up.  Patient has not been seen since several months postop.  Patient's preop weight was 267, lowest weight 180, and current weight 197.  Patient would like to lose more weight and is interested in obesity medicine. Reports no reflux or constipation.   Patient trying to eat 2 protein-rich meals/day, is drinking adequate zero-calorie fluids/day, exercise is currently limited after a recent foot fracture; not taking bariatric vitamins.\par \par Patient's highest weight prior to Laparoscopic Sleeve Gastrectomy (2013)- 267 lbs; lowest weight after Laparoscopic Sleeve Gastrectomy- 197 lbs\par

## 2023-02-27 NOTE — ASSESSMENT
[FreeTextEntry1] : 57 year old woman with long history of morbid obesity s/p Laparoscopic Sleeve Gastrectomy (2013) last seen several months postop.  Patient has no complaints related to the sleeve and presents today to discuss options for further weight loss.  All questions were answered.  \par \par 30 minutes spent discussing importance of dietary and lifestyle changes for weight loss and long-term weight maintenance. Specifically discussed 3 protein focused meals per day, increase water intake, activity pending clearance from orthopedic surgeon.\par \par Encourage 3 protein-focused meals per day (aim for 60 g - 70 g protein/day)\par Encourage zero calorie fluid intake (64 oz/day)\par Labs now\par Re-start bariatric vitamins \par Exercise with cardio (aim for 8k-10k steps/day), and strength training 2x/week once cleared by Ortho\par Use step counter\par Weigh yourself 1x-2x/week\par Follow-up with nutritionist \par Schedule appointment with obesity medicine/weight management\par Follow-up with orthopedic surgeon for exercise clearance\par Follow-up in 6 weeks\par All questions answered\par \par Call with any questions or concerns\par \par Time before and after visit spent reviewing chart\par \par \par

## 2023-04-06 ENCOUNTER — APPOINTMENT (OUTPATIENT)
Dept: BARIATRICS | Facility: CLINIC | Age: 58
End: 2023-04-06
Payer: COMMERCIAL

## 2023-04-06 VITALS
HEART RATE: 84 BPM | BODY MASS INDEX: 34.02 KG/M2 | OXYGEN SATURATION: 95 % | SYSTOLIC BLOOD PRESSURE: 120 MMHG | HEIGHT: 63 IN | WEIGHT: 192 LBS | TEMPERATURE: 97.1 F | DIASTOLIC BLOOD PRESSURE: 80 MMHG

## 2023-04-06 PROCEDURE — 99214 OFFICE O/P EST MOD 30 MIN: CPT

## 2023-04-10 LAB
BASOPHILS # BLD AUTO: 0.06 K/UL
BASOPHILS NFR BLD AUTO: 0.8 %
EOSINOPHIL # BLD AUTO: 0.06 K/UL
EOSINOPHIL NFR BLD AUTO: 0.8 %
HCT VFR BLD CALC: 30.2 %
HGB BLD-MCNC: 9.5 G/DL
IMM GRANULOCYTES NFR BLD AUTO: 0.3 %
LYMPHOCYTES # BLD AUTO: 2.61 K/UL
LYMPHOCYTES NFR BLD AUTO: 35.9 %
MAN DIFF?: NORMAL
MCHC RBC-ENTMCNC: 26.8 PG
MCHC RBC-ENTMCNC: 31.5 GM/DL
MCV RBC AUTO: 85.3 FL
MONOCYTES # BLD AUTO: 0.51 K/UL
MONOCYTES NFR BLD AUTO: 7 %
NEUTROPHILS # BLD AUTO: 4.02 K/UL
NEUTROPHILS NFR BLD AUTO: 55.2 %
PLATELET # BLD AUTO: 258 K/UL
RBC # BLD: 3.54 M/UL
RBC # FLD: 16.5 %
WBC # FLD AUTO: 7.28 K/UL

## 2023-04-10 RX ORDER — IRON/IRON ASP GLY/FA/MV-MIN 38 125-25-1MG
TABLET ORAL DAILY
Refills: 0 | Status: ACTIVE | COMMUNITY

## 2023-04-13 LAB
25(OH)D3 SERPL-MCNC: 62.6 NG/ML
ALBUMIN SERPL ELPH-MCNC: 4.8 G/DL
ALP BLD-CCNC: 63 U/L
ALT SERPL-CCNC: 40 U/L
ANION GAP SERPL CALC-SCNC: 14 MMOL/L
AST SERPL-CCNC: 49 U/L
BILIRUB SERPL-MCNC: 0.6 MG/DL
BUN SERPL-MCNC: 18 MG/DL
CALCIUM SERPL-MCNC: 10.3 MG/DL
CHLORIDE SERPL-SCNC: 102 MMOL/L
CO2 SERPL-SCNC: 25 MMOL/L
CREAT SERPL-MCNC: 0.82 MG/DL
EGFR: 82 ML/MIN/1.73M2
ESTIMATED AVERAGE GLUCOSE: 123 MG/DL
FERRITIN SERPL-MCNC: 1053 NG/ML
FOLATE SERPL-MCNC: 10.3 NG/ML
GLUCOSE SERPL-MCNC: 102 MG/DL
HBA1C MFR BLD HPLC: 5.9 %
IRON SERPL-MCNC: 173 UG/DL
POTASSIUM SERPL-SCNC: 4.7 MMOL/L
PROT SERPL-MCNC: 7.7 G/DL
SODIUM SERPL-SCNC: 141 MMOL/L
TSH SERPL-ACNC: 0.67 UIU/ML
VIT A SERPL-MCNC: 85.5 UG/DL
VIT B1 SERPL-MCNC: 128.9 NMOL/L
VIT B12 SERPL-MCNC: 661 PG/ML
ZINC SERPL-MCNC: 76 UG/DL

## 2023-04-26 NOTE — PHYSICAL EXAM
[Normal] : affect appropriate [de-identified] : soft, NT, ND, no evidence of hernia or diastasis, incisions well-healed

## 2023-04-26 NOTE — REASON FOR VISIT
[Follow-Up Visit] : a follow-up visit for [S/P Bariatric Surgery] : s/p bariatric surgery [FreeTextEntry2] : Patient presents ~10 years s/p Laparoscopic Sleeve Gastrectomy.

## 2023-04-26 NOTE — HISTORY OF PRESENT ILLNESS
[Procedure: ___] : Procedure performed: [unfilled]  [Date of Surgery: ___] : Date of Surgery:   [unfilled] [Surgeon Name:   ___] : Surgeon Name: Dr. VILLASEÑOR [Pre-Op Weight ___] : Pre-op weight was [unfilled] lbs [de-identified] : 58 year old woman presents ~10 years s/p Laparoscopic Sleeve Gastrectomy. Patient is doing well; lost 5 lbs since last visit. Reports no abdominal pain, nausea/vomiting, constipation, diarrhea, reflux/heartburn. Patient eating 3 protein-rich meals/day (reports eating 3 snacks per day; sometimes snacks on cashews and pretzels), drinking adequate zero-calorie fluids/day, is taking bariatric vitamins, and is exercising with cardio- and strength-training with incumbent bicycle and ambulation, and Pilates, ~3x/week (patient has right ankle pain, but reports ankle is healing). Patient has an upcoming appointment (6/30/23) with obesity medicine. \par Patient works in [employer] for [employer]. 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 Will try to obtain patient's charts with the above results, from patient's cardiologist. Reports might need a pacemaker. Patient reports has had arrhythmia since childhood.

## 2023-04-26 NOTE — ASSESSMENT
[FreeTextEntry1] : 58 year old woman presents ~10 years s/p Laparoscopic Sleeve Gastrectomy. Patient is doing well; lost 5 lbs since last visit. Nutriton and exercise guidelines reviewed with the patient. \par \par Continue 3 protein-focused meals/day (aim for 60 g - 70 g protein/day); try to avoid drinking with meals; try to avoid snacking, and starches.\par Encourage zero calorie fluid intake (64 oz/day)\par Continue bariatric vitamins \par Exercise with cardio (aim for 8k-10k steps/day), and strength training 2x/week\par Use step counter\par Weigh yourself 1x-2x/week\par Follow-up in 3 months\par All questions answered\par \par Call with any questions or concerns\par \par Time before and after visit spent reviewing chart\par

## 2023-05-17 ENCOUNTER — APPOINTMENT (OUTPATIENT)
Dept: BARIATRICS/WEIGHT MGMT | Facility: CLINIC | Age: 58
End: 2023-05-17
Payer: COMMERCIAL

## 2023-05-17 PROCEDURE — 99205 OFFICE O/P NEW HI 60 MIN: CPT | Mod: 95

## 2023-05-18 NOTE — ASSESSMENT
[FreeTextEntry1] : Bariatric surgery history: sleeve\par Overweight / obesity comorbidities: bethel htn\par Current anti-obesity medications: none\par Obesity medication side effects: n/a\par \par -nutrition guidance provided\par -more starch in diet\par -discussed high fat foods, ABF\par -educational and cooking resources provided\par -will aim to start wegovy, otherwise phen/tpm\par

## 2023-05-18 NOTE — HISTORY OF PRESENT ILLNESS
[Home] : at home, [unfilled] , at the time of the visit. [Other Location: e.g. Home (Enter Location, City,State)___] : at [unfilled] [Verbal consent obtained from patient] : the patient, [unfilled] [FreeTextEntry1] : Patient presents for weight loss and overweight/obese comorbidity management\par \par negar has had a lot of success with bariatric surgery\par however is plateaued with WL \par and would like some further assistance with nutrition and WL medication\par works a regular work week\par partner is supportive and also trying to lose weight\par diet is v high in fat, as a result of emphasis on protein\par very little starch, adequate vegetables fruits\par \par Due to comorbidities this patient requires medical treatment for overweight / obesity\par

## 2023-05-30 DIAGNOSIS — Z90.3 POSTSURGICAL MALABSORPTION, NOT ELSEWHERE CLASSIFIED: ICD-10-CM

## 2023-05-30 DIAGNOSIS — K91.2 POSTSURGICAL MALABSORPTION, NOT ELSEWHERE CLASSIFIED: ICD-10-CM

## 2023-05-30 DIAGNOSIS — R79.9 ABNORMAL FINDING OF BLOOD CHEMISTRY, UNSPECIFIED: ICD-10-CM

## 2023-06-21 ENCOUNTER — APPOINTMENT (OUTPATIENT)
Dept: BARIATRICS/WEIGHT MGMT | Facility: CLINIC | Age: 58
End: 2023-06-21
Payer: COMMERCIAL

## 2023-06-21 VITALS — BODY MASS INDEX: 33.13 KG/M2 | WEIGHT: 187 LBS

## 2023-06-21 PROCEDURE — 99214 OFFICE O/P EST MOD 30 MIN: CPT | Mod: 95

## 2023-06-21 RX ORDER — SEMAGLUTIDE 1 MG/.5ML
1 INJECTION, SOLUTION SUBCUTANEOUS
Qty: 1 | Refills: 3 | Status: DISCONTINUED | COMMUNITY
Start: 2023-06-02 | End: 2023-06-21

## 2023-06-21 RX ORDER — SEMAGLUTIDE 0.5 MG/.5ML
0.5 INJECTION, SOLUTION SUBCUTANEOUS
Qty: 1 | Refills: 5 | Status: DISCONTINUED | COMMUNITY
Start: 2023-06-02 | End: 2023-06-21

## 2023-06-22 NOTE — HISTORY OF PRESENT ILLNESS
[Home] : at home, [unfilled] , at the time of the visit. [Other Location: e.g. Home (Enter Location, City,State)___] : at [unfilled] [Verbal consent obtained from patient] : the patient, [unfilled] [FreeTextEntry1] : Patient presents for weight loss and overweight/obese comorbidity management\par \par doing v well diet is more balanced S/V\par continues to be v active with exercise\par medication working at 0.5 mg\par no side effects\par willing to consider either 1.7 mg or saxenda if needed given shortages\par r/b/a discussion had about dose increase\par \par Due to comorbidities this patient requires medical treatment for overweight / obesity\par

## 2023-06-22 NOTE — ASSESSMENT
[FreeTextEntry1] : Bariatric surgery history:\par Overweight / obesity comorbidities:\par Current anti-obesity medications:\par Obesity medication side effects:\par \par -incr wegovy to 1.7, provided zofran for nausea, r/b/a discussion was had\par -continue excellent lifestyle changes\par -continue monthly follow ups for check ins

## 2023-06-30 ENCOUNTER — APPOINTMENT (OUTPATIENT)
Dept: BARIATRICS/WEIGHT MGMT | Facility: CLINIC | Age: 58
End: 2023-06-30

## 2023-07-11 ENCOUNTER — APPOINTMENT (OUTPATIENT)
Dept: BARIATRICS | Facility: CLINIC | Age: 58
End: 2023-07-11
Payer: COMMERCIAL

## 2023-07-11 VITALS
SYSTOLIC BLOOD PRESSURE: 120 MMHG | DIASTOLIC BLOOD PRESSURE: 80 MMHG | BODY MASS INDEX: 33.12 KG/M2 | HEART RATE: 82 BPM | TEMPERATURE: 97.1 F | WEIGHT: 186.95 LBS | OXYGEN SATURATION: 98 % | HEIGHT: 63 IN

## 2023-07-11 VITALS
SYSTOLIC BLOOD PRESSURE: 110 MMHG | DIASTOLIC BLOOD PRESSURE: 80 MMHG | BODY MASS INDEX: 26.76 KG/M2 | HEIGHT: 63 IN | OXYGEN SATURATION: 97 % | HEART RATE: 112 BPM | WEIGHT: 151.01 LBS | TEMPERATURE: 97.2 F

## 2023-07-11 PROCEDURE — 99214 OFFICE O/P EST MOD 30 MIN: CPT

## 2023-07-12 DIAGNOSIS — E55.9 VITAMIN D DEFICIENCY, UNSPECIFIED: ICD-10-CM

## 2023-07-13 NOTE — ASSESSMENT
[FreeTextEntry1] : 58 year old woman s/p Laparoscopic Sleeve Gastrectomy. Patient doing well; lost 6 lbs since last visit. Nutriton and exercise guidelines reviewed with the patient. \par \par Continue 3 protein-focused meals/day (aim for 60 g - 70 g protein/day)\par Encourage zero calorie fluid intake (64 oz/day)\par Continue bariatric vitamins \par Exercise with cardio (aim for 8k-10k steps/day), and strength training 2x/week\par Use step counter\par Weigh yourself 1x-2x/week\par Try the Calm rashmi for stress management\par Follow-up with nutritionist\par Follow-up with Obesity Medicine \par Follow-up in 3 months \par All questions answered\par \par Call with any questions or concerns\par \par Time before and after visit spent reviewing chart\par

## 2023-07-13 NOTE — PHYSICAL EXAM
[Normal] : affect appropriate [de-identified] : soft, NT, ND, no evidence of hernia or diastasis, incisions well-healed

## 2023-07-13 NOTE — HISTORY OF PRESENT ILLNESS
[Procedure: ___] : Procedure performed: [unfilled]  [Date of Surgery: ___] : Date of Surgery:   [unfilled] [Surgeon Name:   ___] : Surgeon Name: Dr. VILLASEÑOR [Pre-Op Weight ___] : Pre-op weight was [unfilled] lbs [de-identified] : 58 year old woman s/p Laparoscopic Sleeve Gastrectomy. Patient doing well; lost 6 lbs since last visit. Patient sees Obesity Medicine (Dr. Cortés); on Wegovy 0.25 mg for 5 weeks. Reports no abdominal pain, nausea/vomiting, constipation, reflux/heartburn; reports occasional diarrhea. Patient eating 3 protein-rich meals/day (reports Dr. Cortés encouraging plant-based diet) , drinking adequate zero-calorie fluids/day (32-64 oz water/day), taking bariatric vitamins, and walking, doing chair yoga, and is swimming. Pt has Hx of sarcoidosis. Is having Pacemaker placed July 26th for arrhythmia.

## 2023-08-02 ENCOUNTER — OUTPATIENT (OUTPATIENT)
Dept: OUTPATIENT SERVICES | Facility: HOSPITAL | Age: 58
LOS: 1 days | End: 2023-08-02
Payer: COMMERCIAL

## 2023-08-02 ENCOUNTER — APPOINTMENT (OUTPATIENT)
Dept: BARIATRICS/WEIGHT MGMT | Facility: CLINIC | Age: 58
End: 2023-08-02
Payer: COMMERCIAL

## 2023-08-02 DIAGNOSIS — I10 ESSENTIAL (PRIMARY) HYPERTENSION: ICD-10-CM

## 2023-08-02 PROCEDURE — 99214 OFFICE O/P EST MOD 30 MIN: CPT | Mod: 95

## 2023-08-02 PROCEDURE — G0463: CPT

## 2023-08-02 RX ORDER — SEMAGLUTIDE 1.7 MG/.75ML
1.7 INJECTION, SOLUTION SUBCUTANEOUS
Qty: 1 | Refills: 5 | Status: DISCONTINUED | COMMUNITY
Start: 2023-06-21 | End: 2023-08-02

## 2023-08-02 NOTE — ASSESSMENT
[FreeTextEntry1] : Bariatric surgery history: sleeve Overweight / obesity comorbidities: bethel htn Current anti-obesity medications: wegovy Obesity medication side effects: none  -supportive counseling and positive reinforcement provided -incr wegovy to 1 mg if possible, if in supply -continue 2 - 3 mo follow ups

## 2023-08-02 NOTE — HISTORY OF PRESENT ILLNESS
[FreeTextEntry1] : Patient presents for weight loss and overweight/obese comorbidity management  doing well losing weight changing diet exercise is on and off but motivated to continue current course tolerating wegovy 0.5 mg, but wants to increase unclear whether pharmacy will have the 1 mg dose in stock but will be covered with script through August at 0.5 mg  Due to comorbidities this patient requires medical treatment for overweight / obesity

## 2023-08-03 DIAGNOSIS — G47.33 OBSTRUCTIVE SLEEP APNEA (ADULT) (PEDIATRIC): ICD-10-CM

## 2023-08-03 DIAGNOSIS — E66.9 OBESITY, UNSPECIFIED: ICD-10-CM

## 2023-10-13 ENCOUNTER — APPOINTMENT (OUTPATIENT)
Age: 58
End: 2023-10-13
Payer: COMMERCIAL

## 2023-10-13 VITALS — WEIGHT: 168 LBS | BODY MASS INDEX: 29.76 KG/M2

## 2023-10-13 PROCEDURE — 99214 OFFICE O/P EST MOD 30 MIN: CPT | Mod: 95

## 2023-10-24 ENCOUNTER — APPOINTMENT (OUTPATIENT)
Dept: BARIATRICS | Facility: CLINIC | Age: 58
End: 2023-10-24
Payer: COMMERCIAL

## 2023-10-24 VITALS
HEART RATE: 74 BPM | WEIGHT: 175 LBS | TEMPERATURE: 96.7 F | SYSTOLIC BLOOD PRESSURE: 120 MMHG | DIASTOLIC BLOOD PRESSURE: 74 MMHG | HEIGHT: 63 IN | BODY MASS INDEX: 31.01 KG/M2 | OXYGEN SATURATION: 99 %

## 2023-10-24 PROCEDURE — 99214 OFFICE O/P EST MOD 30 MIN: CPT

## 2023-10-24 RX ORDER — ALBUTEROL SULFATE 90 UG/1
108 (90 BASE) AEROSOL, METERED RESPIRATORY (INHALATION) EVERY 6 HOURS
Qty: 1 | Refills: 2 | Status: DISCONTINUED | COMMUNITY
Start: 2020-04-15 | End: 2023-10-24

## 2023-10-24 RX ORDER — IPRATROPIUM BROMIDE 42 UG/1
0.06 SPRAY NASAL 3 TIMES DAILY
Qty: 3 | Refills: 2 | Status: DISCONTINUED | COMMUNITY
Start: 2020-04-15 | End: 2023-10-24

## 2023-10-24 RX ORDER — FLUTICASONE FUROATE AND VILANTEROL TRIFENATATE 200; 25 UG/1; UG/1
200-25 POWDER RESPIRATORY (INHALATION) DAILY
Qty: 3 | Refills: 0 | Status: DISCONTINUED | COMMUNITY
Start: 2019-09-19 | End: 2023-10-24

## 2023-10-25 RX ORDER — DOCUSATE SODIUM 100 MG
100 CAPSULE ORAL
Refills: 0 | Status: ACTIVE | COMMUNITY

## 2023-10-25 RX ORDER — EZETIMIBE 10 MG/1
10 TABLET ORAL DAILY
Refills: 0 | Status: ACTIVE | COMMUNITY

## 2024-01-12 ENCOUNTER — APPOINTMENT (OUTPATIENT)
Dept: BARIATRICS/WEIGHT MGMT | Facility: CLINIC | Age: 59
End: 2024-01-12
Payer: COMMERCIAL

## 2024-01-12 DIAGNOSIS — I10 ESSENTIAL (PRIMARY) HYPERTENSION: ICD-10-CM

## 2024-01-12 DIAGNOSIS — E66.9 OBESITY, UNSPECIFIED: ICD-10-CM

## 2024-01-12 PROCEDURE — 99215 OFFICE O/P EST HI 40 MIN: CPT | Mod: 95

## 2024-01-12 NOTE — HISTORY OF PRESENT ILLNESS
[Home] : at home, [unfilled] , at the time of the visit. [Other Location: e.g. Home (Enter Location, City,State)___] : at [unfilled] [FreeTextEntry1] : Patient presents for weight loss and overweight/obese comorbidity management  doing well maintaining lapse in getting wegovy d/t insurance approval [place] resubmitted Keke has a few questions about the medication getting back to gym as primary lifestyle change  Due to comorbidities this patient requires medical treatment for overweight / obesity

## 2024-01-12 NOTE — ASSESSMENT
[FreeTextEntry1] : Bariatric surgery history: sleeve Overweight / obesity comorbidities: bethel htn Current anti-obesity medications: wegovy Obesity medication side effects: none  talked at length about insurance issues; PA resubmitted ~30 mins extra time spent on insurance authorization reviewed plan for next 6 months continue excellent lifestyle efforts
Residential stability/Relationship stability

## 2024-03-28 ENCOUNTER — APPOINTMENT (OUTPATIENT)
Dept: BARIATRICS | Facility: CLINIC | Age: 59
End: 2024-03-28
Payer: COMMERCIAL

## 2024-03-28 VITALS
DIASTOLIC BLOOD PRESSURE: 70 MMHG | OXYGEN SATURATION: 99 % | TEMPERATURE: 97.7 F | HEART RATE: 88 BPM | BODY MASS INDEX: 28.36 KG/M2 | SYSTOLIC BLOOD PRESSURE: 110 MMHG | HEIGHT: 63 IN | WEIGHT: 160.05 LBS

## 2024-03-28 DIAGNOSIS — Z98.84 BARIATRIC SURGERY STATUS: ICD-10-CM

## 2024-03-28 DIAGNOSIS — R63.4 ABNORMAL WEIGHT LOSS: ICD-10-CM

## 2024-03-28 DIAGNOSIS — E46 UNSPECIFIED PROTEIN-CALORIE MALNUTRITION: ICD-10-CM

## 2024-03-28 DIAGNOSIS — R53.83 OTHER FATIGUE: ICD-10-CM

## 2024-03-28 PROCEDURE — 99214 OFFICE O/P EST MOD 30 MIN: CPT

## 2024-03-28 RX ORDER — SEMAGLUTIDE 1.7 MG/.75ML
1.7 INJECTION, SOLUTION SUBCUTANEOUS
Refills: 0 | Status: DISCONTINUED | COMMUNITY
End: 2024-03-28

## 2024-03-28 RX ORDER — SEMAGLUTIDE 0.5 MG/.5ML
0.5 INJECTION, SOLUTION SUBCUTANEOUS
Qty: 1 | Refills: 5 | Status: DISCONTINUED | COMMUNITY
Start: 2023-05-22 | End: 2024-03-28

## 2024-04-29 LAB
25(OH)D3 SERPL-MCNC: 74.1 NG/ML
ALBUMIN SERPL ELPH-MCNC: 5 G/DL
ALP BLD-CCNC: 72 U/L
ALT SERPL-CCNC: 22 U/L
ANION GAP SERPL CALC-SCNC: 17 MMOL/L
AST SERPL-CCNC: 37 U/L
BASOPHILS # BLD AUTO: 0.05 K/UL
BASOPHILS NFR BLD AUTO: 1.1 %
BILIRUB SERPL-MCNC: 0.8 MG/DL
BUN SERPL-MCNC: 12 MG/DL
CALCIUM SERPL-MCNC: 10.5 MG/DL
CHLORIDE SERPL-SCNC: 103 MMOL/L
CO2 SERPL-SCNC: 23 MMOL/L
CREAT SERPL-MCNC: 0.92 MG/DL
EGFR: 71 ML/MIN/1.73M2
EOSINOPHIL # BLD AUTO: 0.09 K/UL
EOSINOPHIL NFR BLD AUTO: 2 %
ESTIMATED AVERAGE GLUCOSE: 108 MG/DL
FOLATE SERPL-MCNC: 4.8 NG/ML
GLUCOSE SERPL-MCNC: 74 MG/DL
HBA1C MFR BLD HPLC: 5.4 %
HCT VFR BLD CALC: 36.7 %
HGB BLD-MCNC: 11.6 G/DL
IMM GRANULOCYTES NFR BLD AUTO: 0.2 %
IRON SERPL-MCNC: 147 UG/DL
LYMPHOCYTES # BLD AUTO: 2.18 K/UL
LYMPHOCYTES NFR BLD AUTO: 48.7 %
MAN DIFF?: NORMAL
MCHC RBC-ENTMCNC: 26.1 PG
MCHC RBC-ENTMCNC: 31.6 GM/DL
MCV RBC AUTO: 82.7 FL
MONOCYTES # BLD AUTO: 0.29 K/UL
MONOCYTES NFR BLD AUTO: 6.5 %
NEUTROPHILS # BLD AUTO: 1.86 K/UL
NEUTROPHILS NFR BLD AUTO: 41.5 %
PLATELET # BLD AUTO: 240 K/UL
POTASSIUM SERPL-SCNC: 4.3 MMOL/L
PREALB SERPL NEPH-MCNC: 37 MG/DL
PROT SERPL-MCNC: 8 G/DL
RBC # BLD: 4.44 M/UL
RBC # FLD: 15.9 %
SODIUM SERPL-SCNC: 143 MMOL/L
VIT B12 SERPL-MCNC: 686 PG/ML
WBC # FLD AUTO: 4.48 K/UL

## 2024-04-30 LAB
VIT A SERPL-MCNC: 75.8 UG/DL
VIT B1 SERPL-MCNC: 94.2 NMOL/L
ZINC SERPL-MCNC: 72 UG/DL

## 2024-05-15 ENCOUNTER — TRANSCRIPTION ENCOUNTER (OUTPATIENT)
Age: 59
End: 2024-05-15

## 2024-05-15 ENCOUNTER — APPOINTMENT (OUTPATIENT)
Dept: BARIATRICS/WEIGHT MGMT | Facility: CLINIC | Age: 59
End: 2024-05-15
Payer: COMMERCIAL

## 2024-05-15 ENCOUNTER — OUTPATIENT (OUTPATIENT)
Dept: OUTPATIENT SERVICES | Facility: HOSPITAL | Age: 59
LOS: 1 days | End: 2024-05-15
Payer: COMMERCIAL

## 2024-05-15 VITALS — BODY MASS INDEX: 26.93 KG/M2 | WEIGHT: 152 LBS

## 2024-05-15 DIAGNOSIS — E66.3 OVERWEIGHT: ICD-10-CM

## 2024-05-15 DIAGNOSIS — I10 ESSENTIAL (PRIMARY) HYPERTENSION: ICD-10-CM

## 2024-05-15 DIAGNOSIS — G47.33 OBSTRUCTIVE SLEEP APNEA (ADULT) (PEDIATRIC): ICD-10-CM

## 2024-05-15 PROCEDURE — G0463: CPT

## 2024-05-15 PROCEDURE — 99214 OFFICE O/P EST MOD 30 MIN: CPT | Mod: 95

## 2024-05-15 RX ORDER — SEMAGLUTIDE 2.4 MG/.75ML
2.4 INJECTION, SOLUTION SUBCUTANEOUS
Qty: 1 | Refills: 5 | Status: ACTIVE | COMMUNITY
Start: 2023-08-02 | End: 1900-01-01

## 2024-05-15 RX ORDER — ONDANSETRON 4 MG/1
4 TABLET ORAL
Qty: 21 | Refills: 5 | Status: ACTIVE | COMMUNITY
Start: 2023-06-21 | End: 1900-01-01

## 2024-05-20 RX ORDER — MULTIVIT-MIN/IRON/FOLIC ACID/K 45-800-120
CAPSULE ORAL DAILY
Refills: 0 | Status: ACTIVE | COMMUNITY

## 2024-05-20 NOTE — PHYSICAL EXAM
[Normal] : affect appropriate [de-identified] : soft, NT, ND, no evidence of hernia or diastasis, incisions well-healed; abdominal pannus observed

## 2024-05-20 NOTE — HISTORY OF PRESENT ILLNESS
[Procedure: ___] : Procedure performed: [unfilled]  [Date of Surgery: ___] : Date of Surgery:   [unfilled] [Surgeon Name:   ___] : Surgeon Name: Dr. VILLASEÑOR [Pre-Op Weight ___] : Pre-op weight was [unfilled] lbs [de-identified] : 59-year-old woman s/p Laparoscopic Sleeve Gastrectomy presents for follow-up. 15 lb weight loss since last visit; on Wegovy 2.4 mg (by OM); reports no side effects. Reports no abdominal pain, nausea/vomiting, constipation (takes Dulcolax prn), diarrhea, reflux/heartburn; reports abdominal pannus. Patient eating 3 protein-rich meals/day, is trying to drink adequate zero-calorie fluids/day (50-60 oz/day), taking bariatric vitamins, and exercising (i.e. walking 3-4x/week, doing cardio- 3-4x/week, and doing strength-training 2-3x/week).

## 2024-05-20 NOTE — ASSESSMENT
[FreeTextEntry1] : 59-year-old woman s/p Laparoscopic Sleeve Gastrectomy presents for follow-up. 15 lb weight loss since last visit; on Wegovy 2.4 mg (by OM); reports no side effects. Nutrition and exercise guidelines reviewed with the patient.    Continue 3 protein-focused meals/day (aim for 60 g - 70 g protein/day) Increase zero calorie fluid intake (64 oz/day) Continue bariatric vitamins Continue Dulcolax prn for occasional constipation; can also take daily fiber supplement (e.g. Benefiber)  but only if drinking 64 oz water/day to avoid worsened constipation Exercise with cardio (aim for 8k-10k steps/day) and strength training 2-3x/week Use step counter Weigh yourself 1x-2x/week Try the Calm rashmi or Soothing Pod rashmi for stress management Follow-up with nutritionist (keep a food log) Complete blood work (ordered at today's visit) Follow-up in 6 months All questions answered   Call with any questions or concerns   Time before and after visit spent reviewing chart

## 2024-05-27 NOTE — ASSESSMENT
[FreeTextEntry1] : Bariatric surgery history: lap band Overweight / obesity comorbidities: htn Current anti-obesity medications: wegovy Obesity medication side effects: nausea  reviewed key nutrition concepts, negar cuadra reviewed options for long-term med use cont wegovy zofran for nausea 6 mo follow up

## 2024-05-27 NOTE — HISTORY OF PRESENT ILLNESS
[Home] : at home, [unfilled] , at the time of the visit. [Other Location: e.g. Home (Enter Location, City,State)___] : at [unfilled] [Verbal consent obtained from patient] : the patient, [unfilled] [FreeTextEntry1] : Patient presents for weight loss and overweight/obese comorbidity management  doing v well at [facility] goal diet is much improved and v active questions about next steps with [facility] medication  Due to comorbidities this patient requires medical treatment for overweight / obesity

## 2024-05-28 DIAGNOSIS — G47.33 OBSTRUCTIVE SLEEP APNEA (ADULT) (PEDIATRIC): ICD-10-CM

## 2024-05-28 DIAGNOSIS — E66.3 OVERWEIGHT: ICD-10-CM

## 2024-09-05 ENCOUNTER — APPOINTMENT (OUTPATIENT)
Dept: BARIATRICS | Facility: CLINIC | Age: 59
End: 2024-09-05
Payer: COMMERCIAL

## 2024-09-05 VITALS
SYSTOLIC BLOOD PRESSURE: 140 MMHG | HEART RATE: 89 BPM | WEIGHT: 151.46 LBS | BODY MASS INDEX: 26.84 KG/M2 | OXYGEN SATURATION: 98 % | DIASTOLIC BLOOD PRESSURE: 90 MMHG | HEIGHT: 63 IN

## 2024-09-05 DIAGNOSIS — R63.5 ABNORMAL WEIGHT GAIN: ICD-10-CM

## 2024-09-05 DIAGNOSIS — Z01.812 ENCOUNTER FOR PREPROCEDURAL LABORATORY EXAMINATION: ICD-10-CM

## 2024-09-05 DIAGNOSIS — E46 UNSPECIFIED PROTEIN-CALORIE MALNUTRITION: ICD-10-CM

## 2024-09-05 DIAGNOSIS — Z90.3 POSTSURGICAL MALABSORPTION, NOT ELSEWHERE CLASSIFIED: ICD-10-CM

## 2024-09-05 DIAGNOSIS — E56.9 VITAMIN DEFICIENCY, UNSPECIFIED: ICD-10-CM

## 2024-09-05 DIAGNOSIS — E61.8 DEFICIENCY OF OTHER SPECIFIED NUTRIENT ELEMENTS: ICD-10-CM

## 2024-09-05 DIAGNOSIS — E78.00 PURE HYPERCHOLESTEROLEMIA, UNSPECIFIED: ICD-10-CM

## 2024-09-05 DIAGNOSIS — Z00.00 ENCOUNTER FOR GENERAL ADULT MEDICAL EXAMINATION W/OUT ABNORMAL FINDINGS: ICD-10-CM

## 2024-09-05 DIAGNOSIS — Z98.84 BARIATRIC SURGERY STATUS: ICD-10-CM

## 2024-09-05 DIAGNOSIS — K91.2 POSTSURGICAL MALABSORPTION, NOT ELSEWHERE CLASSIFIED: ICD-10-CM

## 2024-09-05 DIAGNOSIS — Z98.890 OTHER SPECIFIED POSTPROCEDURAL STATES: ICD-10-CM

## 2024-09-05 DIAGNOSIS — R63.2 POLYPHAGIA: ICD-10-CM

## 2024-09-05 DIAGNOSIS — R79.9 ABNORMAL FINDING OF BLOOD CHEMISTRY, UNSPECIFIED: ICD-10-CM

## 2024-09-05 DIAGNOSIS — R63.8 OTHER SYMPTOMS AND SIGNS CONCERNING FOOD AND FLUID INTAKE: ICD-10-CM

## 2024-09-05 DIAGNOSIS — E66.3 OVERWEIGHT: ICD-10-CM

## 2024-09-05 PROCEDURE — 99214 OFFICE O/P EST MOD 30 MIN: CPT

## 2024-09-05 RX ORDER — EVOLOCUMAB 140 MG/ML
INJECTION, SOLUTION SUBCUTANEOUS
Refills: 0 | Status: ACTIVE | COMMUNITY

## 2024-09-05 NOTE — PHYSICAL EXAM
[Normal] : affect appropriate [de-identified] : Equal chest rise, non-labored respirations, no audible wheezing. [de-identified] : soft, NT, ND, no evidence of hernia or diastasis, incisions well-healed

## 2024-09-05 NOTE — HISTORY OF PRESENT ILLNESS
[de-identified] : 59-year-old woman s/p Laparoscopic Sleeve Gastrectomy presents for follow-up. 9 lb weight loss since last visit; on Wegovy 2.4 mg (by OM); reports no side effects. Patient eating 3 protein-rich meals/day, is trying to drink adequate zero-calorie fluids/day (32-40 oz/day), taking bariatric vitamins, and exercising (i.e. walking 5k-7k steps per day, doing cardio and strength-training 3-4x/week). Pt is undergoing breast lift surgery on 9/23/24. BP elevated today (140/90); pt reports she did not take her BP medications this morning. [Procedure: ___] : Procedure performed: [unfilled]  [Date of Surgery: ___] : Date of Surgery:   [unfilled] [Surgeon Name:   ___] : Surgeon Name: Dr. VILLASEÑOR [Pre-Op Weight ___] : Pre-op weight was [unfilled] lbs

## 2024-09-22 ENCOUNTER — TRANSCRIPTION ENCOUNTER (OUTPATIENT)
Age: 59
End: 2024-09-22

## 2024-09-23 ENCOUNTER — TRANSCRIPTION ENCOUNTER (OUTPATIENT)
Age: 59
End: 2024-09-23

## 2025-03-27 NOTE — END OF VISIT
[Time Spent: ___ minutes] : I have spent [unfilled] minutes of time on the encounter. Bed/Stretcher in lowest position, wheels locked, appropriate side rails in place/Call bell, personal items and telephone in reach/Instruct patient to call for assistance before getting out of bed/chair/stretcher/Non-slip footwear applied when patient is off stretcher/[place] to call system/Physically safe environment - no spills, clutter or unnecessary equipment/Purposeful proactive rounding/Room/bathroom lighting operational, light cord in reach